# Patient Record
Sex: MALE | Race: WHITE | NOT HISPANIC OR LATINO
[De-identification: names, ages, dates, MRNs, and addresses within clinical notes are randomized per-mention and may not be internally consistent; named-entity substitution may affect disease eponyms.]

---

## 2019-03-07 ENCOUNTER — APPOINTMENT (OUTPATIENT)
Dept: HEMATOLOGY ONCOLOGY | Facility: CLINIC | Age: 49
End: 2019-03-07
Payer: COMMERCIAL

## 2019-03-07 VITALS
OXYGEN SATURATION: 96 % | HEART RATE: 85 BPM | HEIGHT: 63 IN | DIASTOLIC BLOOD PRESSURE: 78 MMHG | SYSTOLIC BLOOD PRESSURE: 138 MMHG | TEMPERATURE: 98.8 F | BODY MASS INDEX: 40.22 KG/M2 | WEIGHT: 227 LBS

## 2019-03-07 DIAGNOSIS — Z82.49 FAMILY HISTORY OF ISCHEMIC HEART DISEASE AND OTHER DISEASES OF THE CIRCULATORY SYSTEM: ICD-10-CM

## 2019-03-07 DIAGNOSIS — Z83.1 FAMILY HISTORY OF OTHER INFECTIOUS AND PARASITIC DISEASES: ICD-10-CM

## 2019-03-07 DIAGNOSIS — Z80.1 FAMILY HISTORY OF MALIGNANT NEOPLASM OF TRACHEA, BRONCHUS AND LUNG: ICD-10-CM

## 2019-03-07 DIAGNOSIS — G47.30 SLEEP APNEA, UNSPECIFIED: ICD-10-CM

## 2019-03-07 DIAGNOSIS — Z87.448 PERSONAL HISTORY OF OTHER DISEASES OF URINARY SYSTEM: ICD-10-CM

## 2019-03-07 DIAGNOSIS — K21.9 GASTRO-ESOPHAGEAL REFLUX DISEASE W/OUT ESOPHAGITIS: ICD-10-CM

## 2019-03-07 DIAGNOSIS — Z83.49 FAMILY HISTORY OF OTHER ENDOCRINE, NUTRITIONAL AND METABOLIC DISEASES: ICD-10-CM

## 2019-03-07 DIAGNOSIS — Z78.9 OTHER SPECIFIED HEALTH STATUS: ICD-10-CM

## 2019-03-07 PROCEDURE — 99244 OFF/OP CNSLTJ NEW/EST MOD 40: CPT | Mod: 25

## 2019-03-07 PROCEDURE — 36415 COLL VENOUS BLD VENIPUNCTURE: CPT

## 2019-03-07 RX ORDER — OMEPRAZOLE 40 MG/1
40 CAPSULE, DELAYED RELEASE ORAL
Refills: 0 | Status: ACTIVE | COMMUNITY

## 2019-03-07 NOTE — CONSULT LETTER
[Dear  ___] : Dear ~HUSSEIN, [Consult Letter:] : I had the pleasure of evaluating your patient, [unfilled]. [( Thank you for referring [unfilled] for consultation for _____ )] : Thank you for referring [unfilled] for consultation for [unfilled] [Please see my note below.] : Please see my note below. [Consult Closing:] : Thank you very much for allowing me to participate in the care of this patient.  If you have any questions, please do not hesitate to contact me. [Sincerely,] : Sincerely, [FreeTextEntry2] : [facility] [FreeTextEntry3] : Melody Selby

## 2019-03-07 NOTE — ASSESSMENT
[FreeTextEntry1] : Patient is a 48 year old male with a history of sleep apnea, hypertension and otherwise is good general health referred for evaluation of leukocytosis found on recent annual exam. Possible etiologies for leukocytosis include , obesity, infection,  inflammation, immune disorders, chronic idiopathic leukocytosis and disorders of bone marrow (Myeloproliferative disorders). Have ordered CBC, CMP, Sedimentation rate, C-reactive protein, flow cytometry, erythropoietin level, anti-nuclear antibody, rheumatoid factor, BCR/ABL, molecular KORTNEY 2 assay, KORTNEY 2 exons 12-13, LDH, uric acid, rheumatoid factor t. Patient will return in two weeks to discuss results and further evaluation.. \par

## 2019-03-07 NOTE — REVIEW OF SYSTEMS
[Patient Intake Form Reviewed] : Patient intake form was reviewed [Shortness Of Breath] : shortness of breath [Negative] : Allergic/Immunologic [FreeTextEntry6] : occasional shortness of breath, sleep apnea [FreeTextEntry7] : acid reflux

## 2019-03-07 NOTE — HISTORY OF PRESENT ILLNESS
[de-identified] : Patient is a 48 year old male with a history of sleep apnea, hypertension and otherwise is good general health referred for evaluation of leukocytosis found on recent annual exam. The patient first became aware of a mildly elevated elevated WBC approximately three years ago. In January 2019, the WBC was found to be 12.7 with a normal hemoglobin, hematocrit and platelet count. The CBC was repeated a week later and the WBC was essentially the same at 12.8. In the differential, there was a slight elevation in neutrophils and monocytes. Patient uses a C-PAP mask at night for sleep apnea. He otherwise feels generally well. Denies recent infections, excessive bruising or bleeding, fever, infections, sweats, chills, rigors or major joint issues.

## 2019-03-08 ENCOUNTER — LABORATORY RESULT (OUTPATIENT)
Age: 49
End: 2019-03-08

## 2019-03-11 LAB
ALBUMIN SERPL ELPH-MCNC: 4.6 G/DL
ALP BLD-CCNC: 92 U/L
ALT SERPL-CCNC: 45 U/L
ANA SER IF-ACNC: NEGATIVE
ANION GAP SERPL CALC-SCNC: 15 MMOL/L
AST SERPL-CCNC: 23 U/L
BASOPHILS # BLD AUTO: 0.02 K/UL
BASOPHILS NFR BLD AUTO: 0.2 %
BILIRUB SERPL-MCNC: 0.5 MG/DL
BUN SERPL-MCNC: 18 MG/DL
CALCIUM SERPL-MCNC: 10.1 MG/DL
CHLORIDE SERPL-SCNC: 102 MMOL/L
CO2 SERPL-SCNC: 26 MMOL/L
CREAT SERPL-MCNC: 0.96 MG/DL
CRP SERPL-MCNC: 0.62 MG/DL
EOSINOPHIL # BLD AUTO: 0.26 K/UL
EOSINOPHIL NFR BLD AUTO: 2.1 %
EPO SERPL-MCNC: 10.2 MIU/ML
ERYTHROCYTE [SEDIMENTATION RATE] IN BLOOD BY WESTERGREN METHOD: 6 MM/HR
GLUCOSE SERPL-MCNC: 107 MG/DL
HCT VFR BLD CALC: 46.6 %
HGB BLD-MCNC: 15.6 G/DL
LDH SERPL-CCNC: 224 U/L
LYMPHOCYTES # BLD AUTO: 2.19 K/UL
LYMPHOCYTES NFR BLD AUTO: 18 %
MAN DIFF?: NO
MCHC RBC-ENTMCNC: 30.2 PG
MCHC RBC-ENTMCNC: 33.5 GM/DL
MCV RBC AUTO: 90.3 FL
MONOCYTES # BLD AUTO: 1.28 K/UL
MONOCYTES NFR BLD AUTO: 10.5 %
NEUTROPHILS # BLD AUTO: 8.34 K/UL
NEUTROPHILS NFR BLD AUTO: 68.5 %
PLATELET # BLD AUTO: 279 K/UL
POTASSIUM SERPL-SCNC: 4.1 MMOL/L
PROT SERPL-MCNC: 7.5 G/DL
RBC # BLD: 5.16 M/UL
RBC # FLD: 12.6 %
RHEUMATOID FACT SER QL: <10 IU/ML
SODIUM SERPL-SCNC: 142 MMOL/L
WBC # FLD AUTO: 12.17 K/UL

## 2019-04-04 ENCOUNTER — APPOINTMENT (OUTPATIENT)
Dept: HEMATOLOGY ONCOLOGY | Facility: CLINIC | Age: 49
End: 2019-04-04
Payer: COMMERCIAL

## 2019-04-04 VITALS
HEIGHT: 63 IN | OXYGEN SATURATION: 96 % | HEART RATE: 86 BPM | SYSTOLIC BLOOD PRESSURE: 130 MMHG | BODY MASS INDEX: 39.87 KG/M2 | WEIGHT: 225 LBS | TEMPERATURE: 98.5 F | DIASTOLIC BLOOD PRESSURE: 88 MMHG

## 2019-04-04 PROCEDURE — 99212 OFFICE O/P EST SF 10 MIN: CPT

## 2019-04-04 NOTE — ASSESSMENT
[FreeTextEntry1] : Patient is a 48 year old male with a history of sleep apnea, hypertension and otherwise is good general health who returns for discussion of results for leukocytosis. The patient's labs reveal an elevated WBC of 12.17. Likely etiologies include  chronic inflammation or obesity. KORTNEY 2 results are still pending. He was advised to call office to discuss these pending results. Patient will return in 3 months.

## 2019-04-04 NOTE — END OF VISIT
[FreeTextEntry3] : All medical record entries made by the scribjarod were at my, Dr. Selby direction and personally dictated by me on Apr 04, 2019 . I have reviewed the chart and agree that the record accurately reflects my personal performance of the history, physical exam, assessment and plan. I have also personally directed, reviewed, and agreed with the chart.\par

## 2019-07-15 ENCOUNTER — APPOINTMENT (OUTPATIENT)
Dept: HEMATOLOGY ONCOLOGY | Facility: CLINIC | Age: 49
End: 2019-07-15
Payer: COMMERCIAL

## 2019-07-15 VITALS
HEART RATE: 89 BPM | WEIGHT: 225 LBS | OXYGEN SATURATION: 99 % | DIASTOLIC BLOOD PRESSURE: 90 MMHG | BODY MASS INDEX: 39.87 KG/M2 | HEIGHT: 63 IN | TEMPERATURE: 98.6 F | SYSTOLIC BLOOD PRESSURE: 142 MMHG

## 2019-07-15 DIAGNOSIS — D72.829 ELEVATED WHITE BLOOD CELL COUNT, UNSPECIFIED: ICD-10-CM

## 2019-07-15 LAB
BASOPHILS # BLD AUTO: 0.02 K/UL
BASOPHILS NFR BLD AUTO: 0.2 %
EOSINOPHIL # BLD AUTO: 0.14 K/UL
EOSINOPHIL NFR BLD AUTO: 1.2 %
HCT VFR BLD CALC: 50.7 %
HGB BLD-MCNC: 15.9 G/DL
IMM GRANULOCYTES NFR BLD AUTO: 0.6 %
LYMPHOCYTES # BLD AUTO: 1.91 K/UL
LYMPHOCYTES NFR BLD AUTO: 16.2 %
MAN DIFF?: NORMAL
MCHC RBC-ENTMCNC: 29 PG
MCHC RBC-ENTMCNC: 31.4 GM/DL
MCV RBC AUTO: 92.3 FL
MONOCYTES # BLD AUTO: 1.15 K/UL
MONOCYTES NFR BLD AUTO: 9.8 %
NEUTROPHILS # BLD AUTO: 8.49 K/UL
NEUTROPHILS NFR BLD AUTO: 72 %
PLATELET # BLD AUTO: 305 K/UL
RBC # BLD: 5.49 M/UL
RBC # FLD: 12.8 %
WBC # FLD AUTO: 11.78 K/UL

## 2019-07-15 PROCEDURE — 99213 OFFICE O/P EST LOW 20 MIN: CPT | Mod: 25

## 2019-07-15 PROCEDURE — 36415 COLL VENOUS BLD VENIPUNCTURE: CPT

## 2019-07-15 NOTE — ASSESSMENT
[FreeTextEntry1] : Patient is a 48 year old male with a history of sleep apnea, hypertension and otherwise is good general health who presents for hematologic follow-up for chronic leukocytosis. Possible etiologies include chronic inflammation or obesity. Have ordered CBC, CMP, sedimentation rate, and C-reactive protein. Patient was advised to call office to discuss results and next appointment date.

## 2019-07-15 NOTE — HISTORY OF PRESENT ILLNESS
[de-identified] : Patient is a 48 year old male with a history of sleep apnea, hypertension and otherwise is good general health who presents for hematologic follow-up for chronic leukocytosis. His most recent blood results from March revealed that the WBC was slightly elevated at 12.17 with slightly higher neutrophils. The patient's c-reactive protein was also slightly elevated at 0.62 with a negative EMILEE. BCR/ABL and JAK2 studies were negative. The rheumatoid factor, CMP, LDH, erythropoietin level and sedimentation rate were normal or nondiagnostic. His flow cytometry revealed no immunophenotypic evidence of an atypical myeloid population, detectable blasts or a lymphoproliferative disorder. He continues to use a C-PAP machine at night for his sleep apnea and reports improvement in his sleep. Patient feels generally well at the time, with no acute complaints. Denies fever, chills, sweats or recent infections.

## 2019-07-15 NOTE — END OF VISIT
[FreeTextEntry3] : All medical record entries made by the Scribe were at my, Dr. Melody Selby, direction and personally dictated by me on 07/15/2019. I have reviewed the chart and agree that the record accurately reflects my personal performance of the history, physical exam, assessment and plan. I have also personally directed, reviewed, and agreed with the chart.

## 2019-07-16 LAB
ALBUMIN SERPL ELPH-MCNC: 4.9 G/DL
ALP BLD-CCNC: 97 U/L
ALT SERPL-CCNC: 43 U/L
ANION GAP SERPL CALC-SCNC: 12 MMOL/L
AST SERPL-CCNC: 27 U/L
BILIRUB SERPL-MCNC: 0.9 MG/DL
BUN SERPL-MCNC: 14 MG/DL
CALCIUM SERPL-MCNC: 10.3 MG/DL
CHLORIDE SERPL-SCNC: 99 MMOL/L
CO2 SERPL-SCNC: 28 MMOL/L
CREAT SERPL-MCNC: 1.07 MG/DL
CRP SERPL-MCNC: 0.65 MG/DL
ERYTHROCYTE [SEDIMENTATION RATE] IN BLOOD BY WESTERGREN METHOD: 12 MM/HR
GLUCOSE SERPL-MCNC: 67 MG/DL
POTASSIUM SERPL-SCNC: 5.3 MMOL/L
PROT SERPL-MCNC: 7.7 G/DL
SODIUM SERPL-SCNC: 139 MMOL/L

## 2022-06-10 ENCOUNTER — NON-APPOINTMENT (OUTPATIENT)
Age: 52
End: 2022-06-10

## 2022-08-22 ENCOUNTER — EMERGENCY (EMERGENCY)
Facility: HOSPITAL | Age: 52
LOS: 1 days | Discharge: HOME | End: 2022-08-22
Attending: EMERGENCY MEDICINE
Payer: COMMERCIAL

## 2022-08-22 VITALS
SYSTOLIC BLOOD PRESSURE: 131 MMHG | HEART RATE: 100 BPM | HEIGHT: 67 IN | DIASTOLIC BLOOD PRESSURE: 91 MMHG | TEMPERATURE: 99 F | WEIGHT: 237.66 LBS | OXYGEN SATURATION: 97 % | RESPIRATION RATE: 20 BRPM

## 2022-08-22 LAB
ALBUMIN SERPL ELPH-MCNC: 3.8 G/DL — SIGNIFICANT CHANGE UP (ref 3.5–5)
ALP SERPL-CCNC: 94 U/L — SIGNIFICANT CHANGE UP (ref 40–120)
ALT FLD-CCNC: 57 U/L DA — SIGNIFICANT CHANGE UP (ref 10–60)
ANION GAP SERPL CALC-SCNC: 6 MMOL/L — SIGNIFICANT CHANGE UP (ref 5–17)
AST SERPL-CCNC: 29 U/L — SIGNIFICANT CHANGE UP (ref 10–40)
BASOPHILS # BLD AUTO: 0.04 K/UL — SIGNIFICANT CHANGE UP (ref 0–0.2)
BASOPHILS NFR BLD AUTO: 0.2 % — SIGNIFICANT CHANGE UP (ref 0–2)
BILIRUB SERPL-MCNC: 1 MG/DL — SIGNIFICANT CHANGE UP (ref 0.2–1.2)
BUN SERPL-MCNC: 15 MG/DL — SIGNIFICANT CHANGE UP (ref 7–18)
CALCIUM SERPL-MCNC: 9.5 MG/DL — SIGNIFICANT CHANGE UP (ref 8.4–10.5)
CHLORIDE SERPL-SCNC: 104 MMOL/L — SIGNIFICANT CHANGE UP (ref 96–108)
CO2 SERPL-SCNC: 27 MMOL/L — SIGNIFICANT CHANGE UP (ref 22–31)
CREAT SERPL-MCNC: 0.98 MG/DL — SIGNIFICANT CHANGE UP (ref 0.5–1.3)
EGFR: 95 ML/MIN/1.73M2 — SIGNIFICANT CHANGE UP
EOSINOPHIL # BLD AUTO: 0.03 K/UL — SIGNIFICANT CHANGE UP (ref 0–0.5)
EOSINOPHIL NFR BLD AUTO: 0.2 % — SIGNIFICANT CHANGE UP (ref 0–6)
GLUCOSE SERPL-MCNC: 98 MG/DL — SIGNIFICANT CHANGE UP (ref 70–99)
HCT VFR BLD CALC: 47.5 % — SIGNIFICANT CHANGE UP (ref 39–50)
HGB BLD-MCNC: 15.9 G/DL — SIGNIFICANT CHANGE UP (ref 13–17)
IMM GRANULOCYTES NFR BLD AUTO: 0.6 % — SIGNIFICANT CHANGE UP (ref 0–1.5)
LIDOCAIN IGE QN: 114 U/L — SIGNIFICANT CHANGE UP (ref 73–393)
LYMPHOCYTES # BLD AUTO: 1.13 K/UL — SIGNIFICANT CHANGE UP (ref 1–3.3)
LYMPHOCYTES # BLD AUTO: 6.5 % — LOW (ref 13–44)
MCHC RBC-ENTMCNC: 29.9 PG — SIGNIFICANT CHANGE UP (ref 27–34)
MCHC RBC-ENTMCNC: 33.5 GM/DL — SIGNIFICANT CHANGE UP (ref 32–36)
MCV RBC AUTO: 89.5 FL — SIGNIFICANT CHANGE UP (ref 80–100)
MONOCYTES # BLD AUTO: 1.56 K/UL — HIGH (ref 0–0.9)
MONOCYTES NFR BLD AUTO: 9 % — SIGNIFICANT CHANGE UP (ref 2–14)
NEUTROPHILS # BLD AUTO: 14.56 K/UL — HIGH (ref 1.8–7.4)
NEUTROPHILS NFR BLD AUTO: 83.5 % — HIGH (ref 43–77)
NRBC # BLD: 0 /100 WBCS — SIGNIFICANT CHANGE UP (ref 0–0)
PLATELET # BLD AUTO: 292 K/UL — SIGNIFICANT CHANGE UP (ref 150–400)
POTASSIUM SERPL-MCNC: 4.1 MMOL/L — SIGNIFICANT CHANGE UP (ref 3.5–5.3)
POTASSIUM SERPL-SCNC: 4.1 MMOL/L — SIGNIFICANT CHANGE UP (ref 3.5–5.3)
PROT SERPL-MCNC: 8.2 G/DL — SIGNIFICANT CHANGE UP (ref 6–8.3)
RBC # BLD: 5.31 M/UL — SIGNIFICANT CHANGE UP (ref 4.2–5.8)
RBC # FLD: 12.9 % — SIGNIFICANT CHANGE UP (ref 10.3–14.5)
SODIUM SERPL-SCNC: 137 MMOL/L — SIGNIFICANT CHANGE UP (ref 135–145)
WBC # BLD: 17.42 K/UL — HIGH (ref 3.8–10.5)
WBC # FLD AUTO: 17.42 K/UL — HIGH (ref 3.8–10.5)

## 2022-08-22 PROCEDURE — 96361 HYDRATE IV INFUSION ADD-ON: CPT

## 2022-08-22 PROCEDURE — 36415 COLL VENOUS BLD VENIPUNCTURE: CPT

## 2022-08-22 PROCEDURE — 83690 ASSAY OF LIPASE: CPT

## 2022-08-22 PROCEDURE — 99284 EMERGENCY DEPT VISIT MOD MDM: CPT

## 2022-08-22 PROCEDURE — 99284 EMERGENCY DEPT VISIT MOD MDM: CPT | Mod: 25

## 2022-08-22 PROCEDURE — 85025 COMPLETE CBC W/AUTO DIFF WBC: CPT

## 2022-08-22 PROCEDURE — 80053 COMPREHEN METABOLIC PANEL: CPT

## 2022-08-22 PROCEDURE — 96374 THER/PROPH/DIAG INJ IV PUSH: CPT

## 2022-08-22 RX ORDER — ONDANSETRON 8 MG/1
4 TABLET, FILM COATED ORAL ONCE
Refills: 0 | Status: COMPLETED | OUTPATIENT
Start: 2022-08-22 | End: 2022-08-22

## 2022-08-22 RX ORDER — SODIUM CHLORIDE 9 MG/ML
1000 INJECTION, SOLUTION INTRAVENOUS ONCE
Refills: 0 | Status: COMPLETED | OUTPATIENT
Start: 2022-08-22 | End: 2022-08-22

## 2022-08-22 RX ADMIN — ONDANSETRON 4 MILLIGRAM(S): 8 TABLET, FILM COATED ORAL at 18:00

## 2022-08-22 RX ADMIN — SODIUM CHLORIDE 1000 MILLILITER(S): 9 INJECTION, SOLUTION INTRAVENOUS at 18:50

## 2022-08-22 RX ADMIN — Medication 30 MILLILITER(S): at 18:00

## 2022-08-22 RX ADMIN — SODIUM CHLORIDE 1000 MILLILITER(S): 9 INJECTION, SOLUTION INTRAVENOUS at 17:59

## 2022-08-22 NOTE — ED PROVIDER NOTE - NSFOLLOWUPINSTRUCTIONS_ED_ALL_ED_FT
-You were seen in the Emergency Department (ED) for abdominal pain. Lab and imaging results, if performed, were discussed with you along with your discharge diagnosis.    FOLLOW-UP:  -Please follow up with your PMD if symptoms return or for any concerning matter pertaining to your abdominal pain.  -Please follow up with your private physician within the next 72 hours. Tell them you were recently in the ED for an urgent issue and would like to be seen. Bring copies of your results if you were given.   -If you do not have a PMD, please call [phone] to find one convenient for you or call our clinic at [phone].    MEDICATIONS:  -Continue all other prescribed medicine, IF ANY, as per your primary care doctor's (PMD) recommendations.    PAIN CONTROL:  -Please take over the counter Tylenol (also known as acetaminophen) 650mg every 6 hours or Ibuprofen (also known as motrin, advil) 600mg every 8 hour for your pain, IF ANY, unless you are not supposed to for any reason.  -Rest, stay hydrated with plenty of fluids (drink at least 2 Liters or 64 Ounces of water each day UNLESS you are supposed to restrict fluids or ANY reason.    RETURN PRECAUTIONS:  -Please return to the Emergency Department if you experience ANY new or concerning symptoms, such as, but not limited to: worsening pain, large amount of bleeding, passing out, fever >100.F, shaking chills, inability to see or new double vision, chest pain, difficulty breathing, diffuse abdominal pain, unable to eat or drink, continuous vomiting or diarrhea, unable to move or feel part of your body

## 2022-08-22 NOTE — ED PROVIDER NOTE - PROGRESS NOTE DETAILS
patient reassessed after medications. patient symptoms improved. no N/V. abdominal pain rated as 2/10. well appearing. shared decision making with patient, patient would like to go home, will return if symptoms worsen. strict return precautions relayed to patient.

## 2022-08-22 NOTE — ED PROVIDER NOTE - ADDITIONAL NOTES AND INSTRUCTIONS:
Please excuse from work/school as he/she was being evaluated in the Emergency Room at [facility].

## 2022-08-22 NOTE — ED PROVIDER NOTE - OBJECTIVE STATEMENT
50 yo M with a pmhx of HTN, prediabetes presents to the ED with abdominal pain with associated N/V. Was recently started on ozempic from his pmd for weight loss. He started developing symptoms a few days ago. no fevers, chills. Has not been able to tolerate po today. usual state of health yesterday. no other sick contacts at home. Abdominal pain is diffuse in nature, rated 6/10. He denies any other symptoms at bedside.

## 2022-08-22 NOTE — ED PROVIDER NOTE - ATTENDING CONTRIBUTION TO CARE
50 yo M with n/v/d after staring new medication    NAD  RRR  No increased WOB  Abdo soft ND    AP - med rxn vs abdo pathology  labs WBC 17  Feeling better after meds  Dc w/ supportive care

## 2022-08-22 NOTE — ED PROVIDER NOTE - PATIENT PORTAL LINK FT
You can access the FollowMyHealth Patient Portal offered by [facility] by registering at the following website: [email or web address]. By joining [employer]’s FollowMyHealth portal, you will also be able to view your health information using other applications (apps) compatible with our system.

## 2022-08-22 NOTE — ED ADULT TRIAGE NOTE - CHIEF COMPLAINT QUOTE
PT REPORTS S/P TOOK OZEMPIC INJECTION LAST NIGHT. C/O GENERALIZED ABDOMINAL PAIN, BLOATING, N/V AND DIARRHEA SINCE AM

## 2022-08-22 NOTE — ED PROVIDER NOTE - PHYSICAL EXAMINATION
GENERAL: no acute distress, non-toxic appearing  HEAD: normocephalic, atraumatic  HEENT: normal conjunctiva, oral mucosa moist, neck supple  CARDIAC: regular rate and rhythm, normal S1 and S2,  no appreciable murmurs  PULM: clear to ascultation bilaterally, no crackles, rales, rhonchi, or wheezing  GI: abdomen nondistended, obese abdomen, non tender to palpation  : no CVA tenderness, no suprapubic tenderness  NEURO: alert and oriented x 3, normal speech, PERRLA, EOMI, no focal motor or sensory deficits  MSK: no visible deformities, no peripheral edema, calf tenderness/redness/swelling  SKIN: no visible rashes, dry, well-perfused  PSYCH: appropriate mood and affect

## 2022-08-22 NOTE — ED PROVIDER NOTE - CLINICAL SUMMARY MEDICAL DECISION MAKING FREE TEXT BOX
50 yo M with a pmhx of HTN, prediabetes presents to the ED with abdominal pain with associated N/V. Was recently started on ozempic from his pmd for weight loss. 1 day of symptoms. no fevers, chills. vitals non actionable. PE as noted above. the differential diagnoses includes, but not limited to: medications related symptoms vs gastro enteritis vs GERD. will symptomatically treat. obtain labs, reassess